# Patient Record
Sex: MALE | Race: BLACK OR AFRICAN AMERICAN | Employment: UNEMPLOYED | URBAN - METROPOLITAN AREA
[De-identification: names, ages, dates, MRNs, and addresses within clinical notes are randomized per-mention and may not be internally consistent; named-entity substitution may affect disease eponyms.]

---

## 2019-07-12 ENCOUNTER — HOSPITAL ENCOUNTER (EMERGENCY)
Age: 39
Discharge: HOME | End: 2019-07-12
Payer: MEDICARE

## 2019-07-12 VITALS
RESPIRATION RATE: 18 BRPM | DIASTOLIC BLOOD PRESSURE: 89 MMHG | WEIGHT: 180 LBS | SYSTOLIC BLOOD PRESSURE: 118 MMHG | BODY MASS INDEX: 25.77 KG/M2 | HEIGHT: 70 IN | OXYGEN SATURATION: 99 % | TEMPERATURE: 98.1 F | HEART RATE: 79 BPM

## 2019-07-12 DIAGNOSIS — R05.9 COUGH: Primary | ICD-10-CM

## 2019-07-12 PROCEDURE — 99283 EMERGENCY DEPT VISIT LOW MDM: CPT

## 2019-07-12 PROCEDURE — 6370000000 HC RX 637 (ALT 250 FOR IP): Performed by: PHYSICIAN ASSISTANT

## 2019-07-12 PROCEDURE — 94640 AIRWAY INHALATION TREATMENT: CPT

## 2019-07-12 RX ORDER — ALBUTEROL SULFATE 90 UG/1
2 AEROSOL, METERED RESPIRATORY (INHALATION) EVERY 6 HOURS PRN
Qty: 1 INHALER | Refills: 0 | Status: SHIPPED | OUTPATIENT
Start: 2019-07-12

## 2019-07-12 RX ORDER — GUAIFENESIN/DEXTROMETHORPHAN 100-10MG/5
10 SYRUP ORAL ONCE
Status: COMPLETED | OUTPATIENT
Start: 2019-07-12 | End: 2019-07-12

## 2019-07-12 RX ORDER — GUAIFENESIN/DEXTROMETHORPHAN 100-10MG/5
5 SYRUP ORAL 3 TIMES DAILY PRN
Qty: 236 ML | Refills: 0 | Status: SHIPPED | OUTPATIENT
Start: 2019-07-12 | End: 2019-07-22

## 2019-07-12 RX ORDER — IPRATROPIUM BROMIDE AND ALBUTEROL SULFATE 2.5; .5 MG/3ML; MG/3ML
1 SOLUTION RESPIRATORY (INHALATION) ONCE
Status: COMPLETED | OUTPATIENT
Start: 2019-07-12 | End: 2019-07-12

## 2019-07-12 RX ORDER — METHYLPREDNISOLONE 4 MG/1
TABLET ORAL
Qty: 1 KIT | Refills: 0 | Status: SHIPPED | OUTPATIENT
Start: 2019-07-12

## 2019-07-12 RX ORDER — PREDNISONE 20 MG/1
60 TABLET ORAL ONCE
Status: COMPLETED | OUTPATIENT
Start: 2019-07-12 | End: 2019-07-12

## 2019-07-12 RX ORDER — AZITHROMYCIN 250 MG/1
TABLET, FILM COATED ORAL
Qty: 1 PACKET | Refills: 0 | Status: SHIPPED | OUTPATIENT
Start: 2019-07-12 | End: 2019-07-16

## 2019-07-12 RX ADMIN — IPRATROPIUM BROMIDE AND ALBUTEROL SULFATE 1 AMPULE: .5; 3 SOLUTION RESPIRATORY (INHALATION) at 22:40

## 2019-07-12 RX ADMIN — PREDNISONE 60 MG: 20 TABLET ORAL at 22:40

## 2019-07-12 RX ADMIN — GUAIFENESIN AND DEXTROMETHORPHAN 10 ML: 100; 10 SYRUP ORAL at 22:40

## 2019-07-12 ASSESSMENT — PAIN SCALES - GENERAL: PAINLEVEL_OUTOF10: 7

## 2019-07-13 NOTE — ED PROVIDER NOTES
eMERGENCY dEPARTMENT eNCOUnter      PCP: Sophy Louise MD    [address] [facility]    Chief Complaint   Patient presents with    Cough     chills, nausea       HPI    Demetrius Aguilera is a 44 y.o. male who presents with 1 day of cough that is occasionally productive of sputum, nasal and chest congestion and chills. Patient is a current smoker. Denies chest pain. Denies measured fevers. Denies known sick contacts. REVIEW OF SYSTEMS    Constitutional:  Denies fever, chills  HEENT:  See above  Cardiovascular:  Denies chest pain, palpitations. Respiratory:  Denies shortness of breath or wheezes  GI:  Denies abdominal pain, vomiting, or diarrhea   Neurologic:  Denies confusion, light headedness, dizziness, or syncope   Skin:  No rash    All other review of systems are negative  See HPI and nursing notes for additional information       PAST MEDICAL AND SURGICAL HISTORY    Past Medical History:   Diagnosis Date    Migraines      History reviewed. No pertinent surgical history. CURRENT MEDICATIONS    Current Outpatient Rx   Medication Sig Dispense Refill    Spacer/Aero-Holding Chambers JENNIFER 1 Device by Does not apply route daily as needed (sob) 1 Device 0    albuterol sulfate  (90 Base) MCG/ACT inhaler Inhale 2 puffs into the lungs every 6 hours as needed for Wheezing or Shortness of Breath (or cough) Please include spacer with instructions for use. 1 Inhaler 0    azithromycin (ZITHROMAX Z-VERÓNICA) 250 MG tablet Take 2 tablets (500 mg) on Day 1, and then take 1 tablet (250 mg) on days 2 through 5. 1 packet 0    methylPREDNISolone (MEDROL, VERÓNICA,) 4 MG tablet Medrol Dose verónica - Use as directed. #1 pack.   (No refills) 1 kit 0    guaiFENesin-dextromethorphan (ROBITUSSIN DM) 100-10 MG/5ML syrup Take 5 mLs by mouth 3 times daily as needed for Cough 236 mL 0    rizatriptan (MAXALT) 5 MG tablet Take 1 tablet by mouth 4 times daily as needed for Migraine May repeat in 4 hours if needed 30 tablet 1

## 2020-07-16 ENCOUNTER — HOSPITAL ENCOUNTER (EMERGENCY)
Age: 40
Discharge: HOME | End: 2020-07-17
Attending: EMERGENCY MEDICINE
Payer: OTHER GOVERNMENT

## 2020-07-16 VITALS
DIASTOLIC BLOOD PRESSURE: 89 MMHG | WEIGHT: 175 LBS | OXYGEN SATURATION: 98 % | HEART RATE: 92 BPM | RESPIRATION RATE: 18 BRPM | BODY MASS INDEX: 25.05 KG/M2 | SYSTOLIC BLOOD PRESSURE: 120 MMHG | TEMPERATURE: 98.2 F | HEIGHT: 70 IN

## 2020-07-16 PROCEDURE — 99281 EMR DPT VST MAYX REQ PHY/QHP: CPT

## 2020-07-16 ASSESSMENT — PAIN DESCRIPTION - ORIENTATION: ORIENTATION: LEFT

## 2020-07-16 ASSESSMENT — PAIN DESCRIPTION - LOCATION: LOCATION: LEG

## 2020-07-16 ASSESSMENT — PAIN SCALES - GENERAL: PAINLEVEL_OUTOF10: 8

## 2020-07-17 PROCEDURE — 6370000000 HC RX 637 (ALT 250 FOR IP): Performed by: EMERGENCY MEDICINE

## 2020-07-17 RX ORDER — CEPHALEXIN 250 MG/1
500 CAPSULE ORAL ONCE
Status: COMPLETED | OUTPATIENT
Start: 2020-07-17 | End: 2020-07-17

## 2020-07-17 RX ORDER — SULFAMETHOXAZOLE AND TRIMETHOPRIM 800; 160 MG/1; MG/1
1 TABLET ORAL 2 TIMES DAILY
Qty: 14 TABLET | Refills: 0 | Status: SHIPPED | OUTPATIENT
Start: 2020-07-17 | End: 2020-07-24

## 2020-07-17 RX ORDER — CEPHALEXIN 500 MG/1
500 CAPSULE ORAL 4 TIMES DAILY
Qty: 28 CAPSULE | Refills: 0 | Status: SHIPPED | OUTPATIENT
Start: 2020-07-17 | End: 2020-07-24

## 2020-07-17 RX ORDER — SULFAMETHOXAZOLE AND TRIMETHOPRIM 800; 160 MG/1; MG/1
1 TABLET ORAL ONCE
Status: COMPLETED | OUTPATIENT
Start: 2020-07-17 | End: 2020-07-17

## 2020-07-17 RX ORDER — CEPHALEXIN 500 MG/1
500 CAPSULE ORAL 4 TIMES DAILY
Qty: 28 CAPSULE | Refills: 0 | Status: SHIPPED | OUTPATIENT
Start: 2020-07-17 | End: 2020-07-17 | Stop reason: SDUPTHER

## 2020-07-17 RX ORDER — SULFAMETHOXAZOLE AND TRIMETHOPRIM 800; 160 MG/1; MG/1
1 TABLET ORAL 2 TIMES DAILY
Qty: 14 TABLET | Refills: 0 | Status: SHIPPED | OUTPATIENT
Start: 2020-07-17 | End: 2020-07-17 | Stop reason: SDUPTHER

## 2020-07-17 RX ADMIN — CEPHALEXIN 500 MG: 250 CAPSULE ORAL at 01:07

## 2020-07-17 RX ADMIN — SULFAMETHOXAZOLE AND TRIMETHOPRIM 1 TABLET: 800; 160 TABLET ORAL at 01:07

## 2020-07-17 NOTE — ED PROVIDER NOTES
EMERGENCY DEPARTMENT ENCOUNTER      CHIEF COMPLAINT:   Possible insect bite to left lower leg    HPI: Sanchez Abdalla is a 36 y.o. male who presents to the Emergency Department complaining of a possible spider bite to the left lower leg. The patient states that he noticed it 2 to 3 days ago. He did not see a spider bite him, but thought casper he may have been bitten. It was initially a small red area that is now become the size of a half dollar.t he has been keeping it clean with alcohol, bacitracin and bandages. He states it seems to be getting bigger. It is not draining. There is no foul-smelling drainage. It is not bleeding. Last tetanus is up-to-date. It is sore. The pain is constant. There are no exacerbating or alleviating factors. The patient denies fevers, chills, chest pain, shortness of breath, abdominal pain, numbness, tingling, weakness, or any other complaints. REVIEW OF SYSTEMS:  CONSTITUTIONAL:  Denies fever, chills, weight loss or weakness  EYES:  Denies photophobia or discharge  ENT:  Denies sore throat or ear pain  CARDIOVASCULAR:  Denies chest pain, palpitations or swelling  RESPIRATORY:  Denies cough or shortness of breath  GI: Denies abdominal pain, nausea, vomiting, or diarrhea  MUSCULOSKELETAL:  Denies back pain  SKIN: See HPI  NEUROLOGIC:  Denies headache, focal weakness or sensory changes  All systems negative except as marked. \"Remaining review of systems reviewed and negative. I have reviewed the nursing triage documentation and agree unless otherwise noted below. \"    PAST MEDICAL HISTORY:   Past Medical History:   Diagnosis Date    Migraines        CURRENT MEDICATIONS:   Home medications reviewed. SURGICAL HISTORY:   No past surgical history on file. FAMILY HISTORY:   No family history on file.     SOCIAL HISTORY:   Social History     Socioeconomic History    Marital status: Single     Spouse name: Not on file    Number of children: Not on file    Years of education: Not on file    Highest education level: Not on file   Occupational History    Not on file   Social Needs    Financial resource strain: Not on file    Food insecurity     Worry: Not on file     Inability: Not on file    Transportation needs     Medical: Not on file     Non-medical: Not on file   Tobacco Use    Smoking status: Current Every Day Smoker     Packs/day: 1.00     Types: Cigarettes    Smokeless tobacco: Never Used   Substance and Sexual Activity    Alcohol use: Yes     Comment: socially    Drug use: No    Sexual activity: Not on file   Lifestyle    Physical activity     Days per week: Not on file     Minutes per session: Not on file    Stress: Not on file   Relationships    Social connections     Talks on phone: Not on file     Gets together: Not on file     Attends Buddhist service: Not on file     Active member of club or organization: Not on file     Attends meetings of clubs or organizations: Not on file     Relationship status: Not on file    Intimate partner violence     Fear of current or ex partner: Not on file     Emotionally abused: Not on file     Physically abused: Not on file     Forced sexual activity: Not on file   Other Topics Concern    Not on file   Social History Narrative    ** Merged History Encounter **            ALLERGIES: Patient has no known allergies. PHYSICAL EXAM:  VITAL SIGNS:   ED Triage Vitals   Enc Vitals Group      BP 07/16/20 2313 120/89      Pulse 07/16/20 2313 92      Resp 07/16/20 2313 18      Temp 07/16/20 2313 98.2 °F (36.8 °C)      Temp Source 07/16/20 2313 Oral      SpO2 07/16/20 2313 98 %      Weight 07/16/20 2309 175 lb (79.4 kg)      Height 07/16/20 2309 5' 10\" (1.778 m)      Head Circumference --       Peak Flow --       Pain Score --       Pain Loc --       Pain Edu? --       Excl.  in [address]? --      Constitutional:  Non-toxic appearance  HENT: Normocephalic, Atraumatic  Eyes: PERRL, conjunctiva normal   Neck: Normal range of motion, No tenderness, Supple, No stridor, No lymphadenopathy  Cardiovascular:  Normal heart rate, Normal rhythm  Pulmonary/Chest:  Normal breath sounds, No respiratory distress, No wheezing  Abdomen: Bowel sounds normal, Soft, No tenderness, No masses, No pulsatile masses  Back:  No tenderness, No CVA tenderness  Extremities: See skin exam, the peripheral pulses are strong, Capillary refill is brisk, there is normal motor and sensory function, the foot is pink, warm, and well perfused, there is no cyanosis  Skin:  Warm, Dry, there is a lesion the size of a half dollar noted to the left mid medial shin that is erythematous with no purulence, fluctuance or induration, the wound appears to be healing well, no bulla, no skin sloughing no necrosis      EKG:    None    Radiology / Procedures:  None    ED COURSE & MEDICAL DECISION MAKING:  Pertinent Labs & Imaging studies reviewed. (See chart for details)  On exam, the patient is afebrile and nontoxic appearing. He is hemodynamically stable and neurologically intact. Extremity exam shows a superficial bug bite with surrounding cellulitis. I suspect that the patient a superficial cellulitis to the left lower leg. I have a low suspicion for DVT, acute fracture, dislocation, compartment syndrome, necrotizing fasciitis, or neurovascular injury. I feel that the patient is stable for outpatient management with follow up in 2-3 days. He is given return precautions. The patient verbalized understanding, was agreeable with plan, and the patient was discharged home in stable condition. Clinical Impression:  1. Bug bite with infection, initial encounter        Disposition referral (if applicable):  Kristin Clemens MD  [address] [place] [address].  [address]    Schedule an appointment as soon as possible for a visit       [facility] Emergency Department  [address] [facility] [phone]  [phone]  Go to If symptoms worsen      Disposition medications (if applicable):  Discharge Medication List as of 7/17/2020  1:03 AM            Comment: Please note this report has been produced using speech recognition software and may contain errors related to that system including errors in grammar, punctuation, and spelling, as well as words and phrases that may be inappropriate. If there are any questions or concerns please feel free to contact the dictating provider for clarification.         Mouna Rutledge MD  07/30/20 2018

## 2020-09-11 ENCOUNTER — HOSPITAL ENCOUNTER (EMERGENCY)
Age: 40
Discharge: HOME | End: 2020-09-11
Payer: MEDICARE

## 2020-09-11 VITALS
HEIGHT: 71 IN | WEIGHT: 175 LBS | DIASTOLIC BLOOD PRESSURE: 86 MMHG | SYSTOLIC BLOOD PRESSURE: 130 MMHG | OXYGEN SATURATION: 99 % | HEART RATE: 69 BPM | BODY MASS INDEX: 24.5 KG/M2 | TEMPERATURE: 97.4 F | RESPIRATION RATE: 18 BRPM

## 2020-09-11 PROCEDURE — 99281 EMR DPT VST MAYX REQ PHY/QHP: CPT

## 2020-09-11 RX ORDER — SULFAMETHOXAZOLE AND TRIMETHOPRIM 800; 160 MG/1; MG/1
1 TABLET ORAL 2 TIMES DAILY
Qty: 14 TABLET | Refills: 0 | Status: SHIPPED | OUTPATIENT
Start: 2020-09-11 | End: 2020-09-18

## 2020-09-11 ASSESSMENT — PAIN SCALES - GENERAL: PAINLEVEL_OUTOF10: 6

## 2020-09-11 ASSESSMENT — PAIN DESCRIPTION - ORIENTATION: ORIENTATION: RIGHT;UPPER

## 2020-09-11 ASSESSMENT — PAIN DESCRIPTION - PAIN TYPE: TYPE: ACUTE PAIN

## 2020-09-11 ASSESSMENT — PAIN DESCRIPTION - LOCATION: LOCATION: LEG

## 2020-09-11 ASSESSMENT — PAIN DESCRIPTION - DESCRIPTORS: DESCRIPTORS: SORE

## 2020-09-11 NOTE — ED PROVIDER NOTES
Well developed, well nourished, no acute distress, non-toxic appearance   HENT:  Atraumatic, Normocephalic. Respiratory:  No respiratory distress, normal breath sounds   Cardiovascular:  Normal rate, normal rhythm, peripheral pulses equal, no edema  GI:  Soft, nondistended, nontender  Musculoskeletal:  No edema, no tenderness, no deformities. Integument:    Over the right anterior thigh region there is well demarcated area of erythema in a annular pattern measuring approximately 3 cm in diameter with underlying induration. There is small central punctum. I am unable to express blood or pus from this area. No fluctuance. No streaks. No extension into medial thigh or groin, genitalia region. Lymphatics: No lymphatic streaks. No discernible swollen lymph nodes in inguinal region. Neurological: alert and oriented, no focal deficits           ED COURSE & MEDICAL DECISION MAKING      Patient presents today with evidence of cutaneous abscess, appears to be resolving as there is granulation tissue at the base and patient previously \"popped\" the area. Patient has MRSA history and therefore will provide patient Rx for Bactrim DS. Patient agrees to have wound check in 48-72 hours. I discussed the recommendation of application of tea tree oil, especially in the early phase-patient can also use in bath during soak. Patient agrees to return emergency department if symptoms worsen or any new symptoms develop. Clinical  IMPRESSION    1. Cutaneous abscess, unspecified site              Comment: Please note this report has been produced using speech recognition software and may contain errors related to that system including errors in grammar, punctuation, and spelling, as well as words and phrases that may be inappropriate. If there are any questions or concerns please feel free to contact the dictating provider for clarification.        [place], [place]  09/11/20 2031

## 2020-09-11 NOTE — ED TRIAGE NOTES
Pt to ED with c/o insect bite to right thigh, believes it may be a spider bite, unable to visualize during triage.

## 2025-08-25 ENCOUNTER — HOSPITAL ENCOUNTER (EMERGENCY)
Age: 45
Discharge: HOME | End: 2025-08-25
Payer: OTHER GOVERNMENT

## 2025-08-25 VITALS
HEART RATE: 90 BPM | BODY MASS INDEX: 24.22 KG/M2 | RESPIRATION RATE: 17 BRPM | TEMPERATURE: 98 F | SYSTOLIC BLOOD PRESSURE: 133 MMHG | OXYGEN SATURATION: 97 % | DIASTOLIC BLOOD PRESSURE: 94 MMHG | HEIGHT: 71 IN | WEIGHT: 173 LBS

## 2025-08-25 DIAGNOSIS — J06.9 UPPER RESPIRATORY TRACT INFECTION, UNSPECIFIED TYPE: Primary | ICD-10-CM

## 2025-08-25 DIAGNOSIS — H60.392 INFECTIVE OTITIS EXTERNA OF LEFT EAR: ICD-10-CM

## 2025-08-25 LAB
INFLUENZA A BY PCR: NOT DETECTED
INFLUENZA B BY PCR: NOT DETECTED
S PYO AG THROAT QL: NEGATIVE
SARS-COV-2 RDRP RESP QL NAA+PROBE: NOT DETECTED
SPECIMEN DESCRIPTION: NORMAL
SPECIMEN SOURCE: NORMAL

## 2025-08-25 PROCEDURE — 99283 EMERGENCY DEPT VISIT LOW MDM: CPT

## 2025-08-25 PROCEDURE — 87635 SARS-COV-2 COVID-19 AMP PRB: CPT

## 2025-08-25 PROCEDURE — 87081 CULTURE SCREEN ONLY: CPT

## 2025-08-25 PROCEDURE — 87502 INFLUENZA DNA AMP PROBE: CPT

## 2025-08-25 PROCEDURE — 6370000000 HC RX 637 (ALT 250 FOR IP): Performed by: PHYSICIAN ASSISTANT

## 2025-08-25 PROCEDURE — 87880 STREP A ASSAY W/OPTIC: CPT

## 2025-08-25 RX ORDER — ALBUTEROL SULFATE 90 UG/1
2 INHALANT RESPIRATORY (INHALATION) EVERY 6 HOURS PRN
Qty: 18 G | Refills: 0 | Status: SHIPPED | OUTPATIENT
Start: 2025-08-25

## 2025-08-25 RX ORDER — CIPROFLOXACIN AND DEXAMETHASONE 3; 1 MG/ML; MG/ML
4 SUSPENSION/ DROPS AURICULAR (OTIC) 2 TIMES DAILY
Qty: 7.5 ML | Refills: 0 | Status: SHIPPED | OUTPATIENT
Start: 2025-08-25 | End: 2025-09-01

## 2025-08-25 RX ORDER — CIPROFLOXACIN HYDROCHLORIDE 3.5 MG/ML
4 SOLUTION/ DROPS TOPICAL ONCE
Status: COMPLETED | OUTPATIENT
Start: 2025-08-25 | End: 2025-08-25

## 2025-08-25 RX ORDER — CIPROFLOXACIN AND FLUOCINOLONE ACETONIDE .75; .0625 MG/.25ML; MG/.25ML
4 SOLUTION AURICULAR (OTIC) ONCE
Status: DISCONTINUED | OUTPATIENT
Start: 2025-08-25 | End: 2025-08-26 | Stop reason: HOSPADM

## 2025-08-25 RX ORDER — DEXTROMETHORPHAN HYDROBROMIDE, GUAIFENESIN AND PSEUDOEPHEDRINE HYDROCHLORIDE 15; 400; 60 MG/1; MG/1; MG/1
1 TABLET ORAL EVERY 6 HOURS PRN
Qty: 30 TABLET | Refills: 0 | Status: SHIPPED | OUTPATIENT
Start: 2025-08-25

## 2025-08-25 RX ADMIN — CIPROFLOXACIN 4 DROP: 3 SOLUTION OPHTHALMIC at 21:52

## 2025-08-25 RX ADMIN — DEXAMETHASONE 4 DROP: 1 FOR SUSPENSION OPHTHALMIC at 21:52

## 2025-08-25 ASSESSMENT — PAIN SCALES - GENERAL
PAINLEVEL_OUTOF10: 6
PAINLEVEL_OUTOF10: 6

## 2025-08-25 ASSESSMENT — LIFESTYLE VARIABLES
HOW OFTEN DO YOU HAVE A DRINK CONTAINING ALCOHOL: NEVER
HOW MANY STANDARD DRINKS CONTAINING ALCOHOL DO YOU HAVE ON A TYPICAL DAY: PATIENT DOES NOT DRINK

## 2025-08-25 ASSESSMENT — PAIN - FUNCTIONAL ASSESSMENT
PAIN_FUNCTIONAL_ASSESSMENT: 0-10
PAIN_FUNCTIONAL_ASSESSMENT: 0-10

## 2025-08-28 LAB
MICROORGANISM SPEC CULT: NORMAL
SPECIMEN DESCRIPTION: NORMAL